# Patient Record
Sex: MALE | Race: OTHER | Employment: UNEMPLOYED | URBAN - METROPOLITAN AREA
[De-identification: names, ages, dates, MRNs, and addresses within clinical notes are randomized per-mention and may not be internally consistent; named-entity substitution may affect disease eponyms.]

---

## 2020-03-17 PROBLEM — G89.29 CHRONIC JAW PAIN: Status: ACTIVE | Noted: 2020-03-17

## 2020-03-17 PROBLEM — R68.84 CHRONIC JAW PAIN: Status: ACTIVE | Noted: 2020-03-17

## 2020-03-17 PROBLEM — M54.6 ACUTE RIGHT-SIDED THORACIC BACK PAIN: Status: ACTIVE | Noted: 2020-03-17

## 2020-03-17 PROBLEM — R10.84 GENERALIZED ABDOMINAL PAIN: Status: ACTIVE | Noted: 2020-03-17

## 2020-03-23 PROBLEM — M27.40 JAW CYST: Status: ACTIVE | Noted: 2020-03-23

## 2020-03-23 PROBLEM — K76.0 HEPATIC STEATOSIS: Status: ACTIVE | Noted: 2020-03-23

## 2023-01-16 ENCOUNTER — OFFICE VISIT (OUTPATIENT)
Dept: FAMILY MEDICINE CLINIC | Facility: CLINIC | Age: 48
End: 2023-01-16

## 2023-01-16 DIAGNOSIS — Z02.9 ADMINISTRATIVE ENCOUNTER: Primary | ICD-10-CM

## 2023-01-16 NOTE — PROGRESS NOTES
Bhupendra Armando is a 49year old male who presents to [facility] with son and daughter with c/o chest pain, left arm tingling/numbness that has resolved and some SOB. States that SOB occurred this morning but has resolved. Pt took aspirin today and left arm tingling/numbness sensation resolved and chest pain has improved. Still with some mild discomfort but wanted to get checked out. Pt well appearing and no acute distress in clinic. Accompanied by: son and daughter  After triage, higher acuity of care was recommended to Bhupendra Armando today. Rationale: need for higher level of care to rule out cardiac issue. Site recommendation: [facility] in [place]   Patient declined transport via EMS and is having daughter drive him  Patient verbalized understanding of rationale for further evaluation and was stable upon discharge.